# Patient Record
Sex: MALE | Race: WHITE | Employment: FULL TIME | URBAN - METROPOLITAN AREA
[De-identification: names, ages, dates, MRNs, and addresses within clinical notes are randomized per-mention and may not be internally consistent; named-entity substitution may affect disease eponyms.]

---

## 2018-11-29 ENCOUNTER — OFFICE VISIT (OUTPATIENT)
Dept: PULMONOLOGY | Age: 43
End: 2018-11-29
Payer: COMMERCIAL

## 2018-11-29 VITALS
OXYGEN SATURATION: 98 % | WEIGHT: 306 LBS | BODY MASS INDEX: 46.38 KG/M2 | SYSTOLIC BLOOD PRESSURE: 120 MMHG | HEIGHT: 68 IN | HEART RATE: 103 BPM | DIASTOLIC BLOOD PRESSURE: 82 MMHG

## 2018-11-29 DIAGNOSIS — G47.10 HYPERSOMNIA: Primary | ICD-10-CM

## 2018-11-29 DIAGNOSIS — E66.01 MORBID OBESITY, UNSPECIFIED OBESITY TYPE (HCC): Chronic | ICD-10-CM

## 2018-11-29 DIAGNOSIS — K21.9 GERD WITHOUT ESOPHAGITIS: Chronic | ICD-10-CM

## 2018-11-29 DIAGNOSIS — R06.83 SNORING: ICD-10-CM

## 2018-11-29 PROCEDURE — 99204 OFFICE O/P NEW MOD 45 MIN: CPT | Performed by: INTERNAL MEDICINE

## 2018-11-29 RX ORDER — FAMOTIDINE 20 MG/1
20 TABLET, FILM COATED ORAL 2 TIMES DAILY PRN
COMMUNITY
End: 2020-02-28

## 2018-11-29 ASSESSMENT — SLEEP AND FATIGUE QUESTIONNAIRES
HOW LIKELY ARE YOU TO NOD OFF OR FALL ASLEEP IN A CAR, WHILE STOPPED FOR A FEW MINUTES IN TRAFFIC: 0
HOW LIKELY ARE YOU TO NOD OFF OR FALL ASLEEP WHILE SITTING QUIETLY AFTER LUNCH WITHOUT ALCOHOL: 1
HOW LIKELY ARE YOU TO NOD OFF OR FALL ASLEEP WHEN YOU ARE A PASSENGER IN A CAR FOR AN HOUR WITHOUT A BREAK: 0
HOW LIKELY ARE YOU TO NOD OFF OR FALL ASLEEP WHILE WATCHING TV: 0
HOW LIKELY ARE YOU TO NOD OFF OR FALL ASLEEP WHILE SITTING AND TALKING TO SOMEONE: 0
NECK CIRCUMFERENCE (INCHES): 20
HOW LIKELY ARE YOU TO NOD OFF OR FALL ASLEEP WHILE LYING DOWN TO REST IN THE AFTERNOON WHEN CIRCUMSTANCES PERMIT: 2
HOW LIKELY ARE YOU TO NOD OFF OR FALL ASLEEP WHILE SITTING INACTIVE IN A PUBLIC PLACE: 0
ESS TOTAL SCORE: 4
HOW LIKELY ARE YOU TO NOD OFF OR FALL ASLEEP WHILE SITTING AND READING: 1

## 2018-11-29 ASSESSMENT — ENCOUNTER SYMPTOMS
APNEA: 1
CHEST TIGHTNESS: 0
CHOKING: 0
NAUSEA: 0
RHINORRHEA: 0
SHORTNESS OF BREATH: 0
ABDOMINAL PAIN: 0
VOMITING: 0
EYE PAIN: 0
PHOTOPHOBIA: 0
ABDOMINAL DISTENTION: 0
ALLERGIC/IMMUNOLOGIC NEGATIVE: 1

## 2018-11-29 NOTE — PROGRESS NOTES
20  Inches     Eyes: Pupils are equal, round, and reactive to light. Conjunctivae, EOM and lids are normal.   Neck: Trachea normal and normal range of motion. Neck supple. No tracheal deviation present. No thyroid mass and no thyromegaly present. Cardiovascular: Normal rate, regular rhythm, S1 normal, S2 normal and normal heart sounds. Pulmonary/Chest: Effort normal. No accessory muscle usage. No apnea, no tachypnea and no bradypnea. He is not intubated. No respiratory distress. He has no decreased breath sounds. He has no wheezes. He has no rhonchi. He has no rales. He exhibits no mass, no tenderness and no deformity. Abdominal: Soft. Bowel sounds are normal. He exhibits no distension. There is no tenderness. Musculoskeletal: He exhibits no edema. Gait - normal  No evidence of cyanosis or clubbing of nails   Lymphadenopathy:        Head (right side): No submental, no submandibular and no tonsillar adenopathy present. Head (left side): No submental, no submandibular and no tonsillar adenopathy present. Neurological: He is alert and oriented to person, place, and time. No cranial nerve deficit. Skin: Skin is warm, dry and intact. No cyanosis. Nails show no clubbing. Psychiatric: He has a normal mood and affect. His speech is normal and behavior is normal. Judgment and thought content normal.   Nursing note and vitals reviewed.       Electronically signed by Bhavesh Montilla MD on11/29/2018 at 1:30 PM

## 2018-12-14 ENCOUNTER — HOSPITAL ENCOUNTER (OUTPATIENT)
Dept: SLEEP CENTER | Age: 43
Discharge: HOME | End: 2018-12-14
Payer: COMMERCIAL

## 2018-12-14 PROCEDURE — 95806 SLEEP STUDY UNATT&RESP EFFT: CPT

## 2018-12-18 PROCEDURE — 95806 SLEEP STUDY UNATT&RESP EFFT: CPT | Performed by: INTERNAL MEDICINE

## 2018-12-19 ENCOUNTER — TELEPHONE (OUTPATIENT)
Dept: PULMONOLOGY | Age: 43
End: 2018-12-19

## 2019-03-08 ENCOUNTER — OFFICE VISIT (OUTPATIENT)
Dept: PULMONOLOGY | Age: 44
End: 2019-03-08
Payer: COMMERCIAL

## 2019-03-08 VITALS
SYSTOLIC BLOOD PRESSURE: 132 MMHG | OXYGEN SATURATION: 98 % | HEIGHT: 68 IN | HEART RATE: 77 BPM | WEIGHT: 310 LBS | BODY MASS INDEX: 46.98 KG/M2 | DIASTOLIC BLOOD PRESSURE: 80 MMHG

## 2019-03-08 DIAGNOSIS — K21.9 GERD WITHOUT ESOPHAGITIS: Chronic | ICD-10-CM

## 2019-03-08 DIAGNOSIS — E66.01 MORBID OBESITY, UNSPECIFIED OBESITY TYPE (HCC): Chronic | ICD-10-CM

## 2019-03-08 DIAGNOSIS — G47.33 OBSTRUCTIVE SLEEP APNEA (ADULT) (PEDIATRIC): Primary | ICD-10-CM

## 2019-03-08 PROCEDURE — 99214 OFFICE O/P EST MOD 30 MIN: CPT | Performed by: NURSE PRACTITIONER

## 2019-03-08 ASSESSMENT — SLEEP AND FATIGUE QUESTIONNAIRES
HOW LIKELY ARE YOU TO NOD OFF OR FALL ASLEEP WHILE SITTING AND TALKING TO SOMEONE: 0
HOW LIKELY ARE YOU TO NOD OFF OR FALL ASLEEP WHILE SITTING AND READING: 1
HOW LIKELY ARE YOU TO NOD OFF OR FALL ASLEEP WHILE WATCHING TV: 0
HOW LIKELY ARE YOU TO NOD OFF OR FALL ASLEEP WHEN YOU ARE A PASSENGER IN A CAR FOR AN HOUR WITHOUT A BREAK: 0
HOW LIKELY ARE YOU TO NOD OFF OR FALL ASLEEP IN A CAR, WHILE STOPPED FOR A FEW MINUTES IN TRAFFIC: 0
HOW LIKELY ARE YOU TO NOD OFF OR FALL ASLEEP WHILE SITTING QUIETLY AFTER LUNCH WITHOUT ALCOHOL: 1
HOW LIKELY ARE YOU TO NOD OFF OR FALL ASLEEP WHILE LYING DOWN TO REST IN THE AFTERNOON WHEN CIRCUMSTANCES PERMIT: 1
HOW LIKELY ARE YOU TO NOD OFF OR FALL ASLEEP WHILE SITTING INACTIVE IN A PUBLIC PLACE: 0
ESS TOTAL SCORE: 3

## 2019-03-08 ASSESSMENT — ENCOUNTER SYMPTOMS
EYE PAIN: 0
SHORTNESS OF BREATH: 0
RHINORRHEA: 0
ABDOMINAL PAIN: 0
APNEA: 0
COUGH: 0
EYE REDNESS: 0
ABDOMINAL DISTENTION: 0
SINUS PRESSURE: 0

## 2019-04-11 ENCOUNTER — TELEPHONE (OUTPATIENT)
Dept: PULMONOLOGY | Age: 44
End: 2019-04-11

## 2020-02-28 ENCOUNTER — OFFICE VISIT (OUTPATIENT)
Dept: PULMONOLOGY | Age: 45
End: 2020-02-28
Payer: COMMERCIAL

## 2020-02-28 VITALS
OXYGEN SATURATION: 98 % | WEIGHT: 309 LBS | SYSTOLIC BLOOD PRESSURE: 122 MMHG | HEART RATE: 81 BPM | DIASTOLIC BLOOD PRESSURE: 76 MMHG | HEIGHT: 68 IN | BODY MASS INDEX: 46.83 KG/M2

## 2020-02-28 PROCEDURE — 99214 OFFICE O/P EST MOD 30 MIN: CPT | Performed by: NURSE PRACTITIONER

## 2020-02-28 ASSESSMENT — ENCOUNTER SYMPTOMS
SINUS PRESSURE: 0
ABDOMINAL PAIN: 0
ABDOMINAL DISTENTION: 0
COUGH: 0
EYE PAIN: 0
EYE REDNESS: 0
APNEA: 0
SHORTNESS OF BREATH: 0
RHINORRHEA: 0

## 2020-02-28 ASSESSMENT — SLEEP AND FATIGUE QUESTIONNAIRES
HOW LIKELY ARE YOU TO NOD OFF OR FALL ASLEEP WHEN YOU ARE A PASSENGER IN A CAR FOR AN HOUR WITHOUT A BREAK: 1
ESS TOTAL SCORE: 1
HOW LIKELY ARE YOU TO NOD OFF OR FALL ASLEEP WHILE SITTING AND READING: 0
HOW LIKELY ARE YOU TO NOD OFF OR FALL ASLEEP WHILE WATCHING TV: 0
HOW LIKELY ARE YOU TO NOD OFF OR FALL ASLEEP IN A CAR, WHILE STOPPED FOR A FEW MINUTES IN TRAFFIC: 0
HOW LIKELY ARE YOU TO NOD OFF OR FALL ASLEEP WHILE SITTING AND TALKING TO SOMEONE: 0
HOW LIKELY ARE YOU TO NOD OFF OR FALL ASLEEP WHILE SITTING QUIETLY AFTER LUNCH WITHOUT ALCOHOL: 0
HOW LIKELY ARE YOU TO NOD OFF OR FALL ASLEEP WHILE LYING DOWN TO REST IN THE AFTERNOON WHEN CIRCUMSTANCES PERMIT: 0
HOW LIKELY ARE YOU TO NOD OFF OR FALL ASLEEP WHILE SITTING INACTIVE IN A PUBLIC PLACE: 0

## 2020-02-28 NOTE — PROGRESS NOTES
Chana Hinson MD, FAASM, [facility]  Manolo Smalls, MSN, RN, CNP     [address] [facility] SLEEP MEDICINE  [address] [address] [facility] [address]  Dept: [phone]  Dept Fax: : [phone] SLEEP MEDICINE  [address] [place] [id number] [phone]    Subjective:     Patient ID: Jenae Dougherty is a 40 y.o. male. Chief Complaint   Patient presents with    Sleep Apnea       HPI:      Machine Present today:  Yes    Machine Modem/Download Info:  Compliance (hours/night): 7.75 hrs/night  Download AHI (/hour): 1.6 /HR  Average CPAP Pressure : 9.1 cmH2O           APAP - Settings  Pressure Min: 7 cmH2O  Pressure Max: 17 cmH2O                 Comfort Settings  Humidity Level (0-8): 2  Heated Tubing (Yes/No): Yes  Flex/EPR (0-3): 3 PAP Mask  Mask Type: Nasal pillows  Mask Model: Nuance Pro  Mask Size: M     [place] - Total score: 1    Follow-up :     Last Visit : March 2019      Patient reports the listed chronic Co-morbidities: GERD, obesity    are well controlled and stable at this time. Subjective Health Changes: None      Over Night Oximetry: [x] Yes  [] No  [] NA [] WNL lots of artifact patient does not recall any major issues not experiencing any symptoms    Using O2: [] Yes  [x] No  [] NA   Patient is compliant with the machine  [x] Yes  [] No   Feeling rested when using the machine   [x] Yes  [] No     Pressure is comfortable with inspiration and expiration  [x] Yes  [] No     Noticed changes in pressure   [] Yes  [] No  [x] NA   Mask is fitting well  [x] Yes  [] No   Noting Mask Air Leak  [] Yes  [x] No   Having painful Aerophagia  [] Yes  [x] No   Nocturia   0  per night.    Having  HA upon waking  [] Yes  [x] No   Dry mouth upon waking   Dry Nose  Dry Eyes  [] Yes  [x] No   Congestion upon waking   [] Yes  [x] No    Nose Bleeds  [] Yes  [x] No   Using Sleep Aides    [x] NA Understands how to change humidification and/or tubing temperature for comfort while at home  [x] Yes  [] No     Difficulties falling asleep  [] Yes  [x] No   Difficulties staying asleep  [] Yes  [x] No   Approximate time to bed  10:30pm   Approximate wake time  7am   Taking Naps  no   If taking naps usual length    [x] NA   If taking naps using the machine  [] Yes  [] No  [x] NA [] With and With out    Drowsy when driving  [] Yes  [x] No     Does patient carry a DOT/CDL  [] Yes  [x] No     Does patient carry FAA/Pilots License   [] Yes  [x] No      Any concerns noted with the machine at this time  [] Yes  [x] No        Diagnosis Orders   1. Obstructive sleep apnea (adult) (pediatric)     2. GERD without esophagitis     3. Morbid obesity, unspecified obesity type ([facility]         The chronic medical conditions listed are directly related to the primary diagnosis listed above. The management of the primary diagnosis affects the secondary diagnosis and vice versa. Review of Systems   Constitutional: Negative for appetite change, chills, fatigue and fever. HENT: Negative for congestion, nosebleeds, rhinorrhea and sinus pressure. Eyes: Negative for pain and redness. Respiratory: Negative for apnea, cough and shortness of breath. Cardiovascular: Negative for chest pain and palpitations. Gastrointestinal: Negative for abdominal distention and abdominal pain. Neurological: Negative for dizziness and headaches. Psychiatric/Behavioral: Negative for sleep disturbance.        Social History     Socioeconomic History    Marital status: Unknown     Spouse name: Not on file    Number of children: Not on file    Years of education: Not on file    Highest education level: Not on file   Occupational History    Not on file   Social Needs    Financial resource strain: Not on file    Food insecurity:     Worry: Not on file     Inability: Not on file    Transportation needs:     Medical: Not on file Non-medical: Not on file   Tobacco Use    Smoking status: Never Smoker    Smokeless tobacco: Never Used   Substance and Sexual Activity    Alcohol use: Not on file    Drug use: Not on file    Sexual activity: Not on file   Lifestyle    Physical activity:     Days per week: Not on file     Minutes per session: Not on file    Stress: Not on file   Relationships    Social connections:     Talks on phone: Not on file     Gets together: Not on file     Attends Church service: Not on file     Active member of club or organization: Not on file     Attends meetings of clubs or organizations: Not on file     Relationship status: Not on file    Intimate partner violence:     Fear of current or ex partner: Not on file     Emotionally abused: Not on file     Physically abused: Not on file     Forced sexual activity: Not on file   Other Topics Concern    Not on file   Social History Narrative    Not on file       Prior to Admission medications    Not on File       Allergies as of 02/28/2020    (No Known Allergies)       Patient Active Problem List   Diagnosis    Morbid obesity, unspecified obesity type ([facility]    GERD without esophagitis    Obstructive sleep apnea (adult) (pediatric)       Past Medical History:   Diagnosis Date    GERD (gastroesophageal reflux disease)     [facility], [facility] 11/29/2018       History reviewed. No pertinent surgical history. Family History   Problem Relation Age of Onset    Diabetes Mother     Asthma Father        Vitals:  Weight BMI   Wt Readings from Last 3 Encounters:   02/28/20 (!) 309 lb (140.2 kg)   03/08/19 (!) 310 lb (140.6 kg)   11/29/18 (!) 306 lb (138.8 kg)    Body mass index is 46.98 kg/m².      BP HR SaO2   BP Readings from Last 3 Encounters:   02/28/20 122/76   03/08/19 132/80   11/29/18 120/82    Pulse Readings from Last 3 Encounters:   02/28/20 81   03/08/19 77   11/29/18 103    SpO2 Readings from Last 3 Encounters:   02/28/20 98% 03/08/19 98%   11/29/18 98%        Assessment/Plan:     GERD without esophagitis  Chronic- Stable. Cont meds per PCP and other physicians. Morbid obesity, unspecified obesity type ([facility]  Chronic-Stable. Encouraged him to work on weight loss through diet and exercise. Obstructive sleep apnea (adult) (pediatric)  Reviewed compliance download with pt. Supplies and parts as needed for his machine. These are medically necessary. Continue medications per his PCP and other physicians. Limit caffeine use after 3pm.  Encouraged him to work on weight loss through diet and exercise. Diagnoses of GERD without esophagitis and Morbid obesity, unspecified obesity type ([facility] were pertinent to this visit. The chronic medical conditions listed are directly related to the primary diagnosis listed above. The management of the primary diagnosis affects the secondary diagnosis and vice versa. The primary encounter diagnosis was Obstructive sleep apnea (adult) (pediatric). Diagnoses of GERD without esophagitis and Morbid obesity, unspecified obesity type [facility]) were also pertinent to this visit. The chronic medical conditions listed are directly related to the primary diagnosis listed above. The management of the primary diagnosis affects the secondary diagnosis and vice versa. - Educated patient and reviewed compliance download with pt.    -Supplies and parts as needed for his machine, these are medically necessary.    - [facility]  -Continue medications per his PCP and other physicians.   -Limit caffeine use after 3pm.    -Encouraged him to work on weight loss through diet and exercise. - Educated on travel machines and the processes to purchase   -F/U: 12 month. No orders of the defined types were placed in this encounter. No orders of the defined types were placed in this encounter. No orders of the defined types were placed in this encounter.       Reanna Cookie Stephenson, MSN, RN, CNP

## 2020-02-28 NOTE — ASSESSMENT & PLAN NOTE
Reviewed compliance download with pt. Supplies and parts as needed for his machine. These are medically necessary. Continue medications per his PCP and other physicians. Limit caffeine use after 3pm.  Encouraged him to work on weight loss through diet and exercise. Diagnoses of GERD without esophagitis and Morbid obesity, unspecified obesity type ([facility] were pertinent to this visit. The chronic medical conditions listed are directly related to the primary diagnosis listed above. The management of the primary diagnosis affects the secondary diagnosis and vice versa.

## 2021-03-05 ENCOUNTER — TELEPHONE (OUTPATIENT)
Dept: PULMONOLOGY | Age: 46
End: 2021-03-05

## 2021-03-05 ENCOUNTER — VIRTUAL VISIT (OUTPATIENT)
Dept: PULMONOLOGY | Age: 46
End: 2021-03-05
Payer: COMMERCIAL

## 2021-03-05 DIAGNOSIS — K21.9 GERD WITHOUT ESOPHAGITIS: Chronic | ICD-10-CM

## 2021-03-05 DIAGNOSIS — E66.01 MORBID OBESITY, UNSPECIFIED OBESITY TYPE (HCC): Chronic | ICD-10-CM

## 2021-03-05 DIAGNOSIS — G47.33 OBSTRUCTIVE SLEEP APNEA (ADULT) (PEDIATRIC): Primary | ICD-10-CM

## 2021-03-05 PROCEDURE — 99213 OFFICE O/P EST LOW 20 MIN: CPT | Performed by: NURSE PRACTITIONER

## 2021-03-05 ASSESSMENT — SLEEP AND FATIGUE QUESTIONNAIRES
HOW LIKELY ARE YOU TO NOD OFF OR FALL ASLEEP IN A CAR, WHILE STOPPED FOR A FEW MINUTES IN TRAFFIC: 0
ESS TOTAL SCORE: 1

## 2021-03-05 NOTE — ASSESSMENT & PLAN NOTE
Patient encouraged to work on maintaining a healthy weight per height. Achievable with diet restriction/modifications and exercise (may consult primary care if unsure of any restrictions or concerns).

## 2021-03-05 NOTE — PROGRESS NOTES
Dash Ryan         : 1975    Diagnosis: [x] TYLOR (G47.33) [] CSA (G47.31) [] Apnea (G47.30)   Length of Need: [x] 12 Months [] 99 Months [] Other:    Machine (MIMI!): [x] Respironics Dream Station      Auto [] ResMed AirSense     Auto [] Other:     []  CPAP () [] Bilevel ()   Mode: [] Auto [] Spontaneous    Mode: [] Auto [] Spontaneous                            Comfort Settings:   - Ramp Pressure:  cmH2O                                        - Ramp time: 15 min                                     -  Flex/EPR - 3 full time                                    - For ResMed Bilevel (TiMax-4 sec   TiMin- 0.2 sec)     Humidifier: [x] Heated ()        [x] Water chamber replacement ()/ 1 per 6 months        Mask:   [x] Nasal () /1 per 3 months [] Full Face () /1 per 3 months   [x] Patient choice -Size and fit mask [] Patient Choice - Size and fit mask   [] Dispense:  [] Dispense:    [x] Headgear () / 1 per 3 months [] Headgear () / 1 per 3 months   [] Replacement Nasal Cushion ()/2 per month [] Interface Replacement ()/1 per month   [x] Replacement Nasal Pillows ()/2 per month         Tubing: [x] Heated ()/1 per 3 months    [] Standard ()/1 per 3 months [] Other:           Filters: [x] Non-disposable ()/1 per 6 months     [x] Ultra-Fine, Disposable ()/2 per month        Miscellaneous: [] Chin Strap ()/ 1 per 6 months [] O2 bleed-in:       LPM   [] Oximetry on CPAP/Bilevel []  Other:    [x] Modem: ()         Start Order Date: 21    MEDICAL JUSTIFICATION:  I, the undersigned, certify that the above prescribed supplies are medically necessary for this patients wellbeing. In my opinion, the supplies are both reasonable and necessary in reference to accepted standards of medicalpractice in treatment of this patients condition.     Suly Omer, FAITH - CNP      NPI: [id number]       Order Signed Date: 21    Electronically signed by FAITH Chan CNP on 3/5/2021 at 9:06 AM    Renetta Rizvi  1975  [address] [address]  [phone] (home)   [phone] (mobile)      Insurance Info (confirm with patient if correct):  Payor/Plan Subscr  Sex Relation Sub.  Ins. ID Effective Group Num

## 2021-03-05 NOTE — PROGRESS NOTES
Vannesa Garcia MD, FAA, [facility]  Cresencio Wilson, MSN, RN, [address] [facility] [address]  Dept: [phone]  Dept Fax: [phone]  Loc: [phone]    Subjective:     Patient ID: Romulo Alcocer is a 39 y.o. male. Chief Complaint   Patient presents with    Sleep Apnea       HPI:      Sleep Medicine Video Visit    Pursuant to the emergency declaration under the [address] [place] [address] waiver authority and the [employer] Act this Telephone Visit was insisted, with patient's consent, to reduce the patient's risk of exposure to COVID-19 and provide continuity of care for an established patient. Services were provided through a synchronous discussion over a telephone and/or Video chat to substitute for in-person clinic visit, and coded as such. While patient is at home.     Machine Modem/Download Info:  Compliance (hours/night): 8 hrs/night  Download AHI (/hour): 1.8 /HR  Average CPAP Pressure : 10.4 cmH2O           APAP - Settings  Pressure Min: 7 cmH2O  Pressure Max: 17 cmH2O                 Comfort Settings  Humidity Level (0-8): 2  Flex/EPR (0-3): 3 PAP Mask  Mask Type: Nasal pillows  Clinically Relevant Leak: No     [place] - Total score: 1    Follow-up :     Last Visit : February 2020      Subjective Health Changes: None      Over Night Oximetry: [] Yes  [] No  [x] NA [] WNL   Using O2: [] Yes  [] No  [x] NA   Patient is compliant with the machine  [x] Yes  [] No [] Per patient   Feeling rested when using the machine   [x] Yes  [] No     Pressure is comfortable with inspiration and expiration  [x] Yes  [] No ([x] NA [] Feeling of suffocation [] Feeling like not enough air  [] To much pressure)     Noticed changes in pressure   [] Yes  [] No  [x] NA   Mask is fitting well  [x] Yes  [] No   Noting Mask Air Leak  [] Yes  [x] No   Having painful Aerophagia  [] Yes  [x] No   Nocturia   0  per night. Having  HA upon waking  [] Yes  [x] No   Dry mouth upon waking   Dry Nose  Dry Eyes  [] Yes  [x] No   Congestion upon waking   [] Yes  [x] No    Nose Bleeds  [] Yes  [x] No   Using Sleep Aides   [] OTC  [] Per our office [] Per another provider  [x] NA   Understands how to change humidification and/or tubing temperature for comfort while at home  [x] Yes  [] No     Difficulties falling asleep  [] Yes  [x] No   Difficulties staying asleep  [] Yes  [x] No   Approximate time to bed  9pm   Approximate wake time  6am   Taking Naps  no   If taking naps usual length    [x] NA   If taking naps using the machine  [] Yes  [] No  [x] NA [] With and With out    Drowsy when driving  [] Yes  [x] No     Does patient carry a DOT/CDL  [] Yes  [x] No     Does patient carry FAA/Pilots License   [] Yes  [x] No      Any concerns noted with the machine at this time  [] Yes  [x] No       Assessment/Plan:   1. Obstructive sleep apnea (adult) (pediatric)  Assessment & Plan:  Reviewed compliance download with pt. Supplies and parts as needed for his machine. These are medically necessary. Continue medications per his PCP and other physicians. Limit caffeine use after 3pm.    The chronic medical conditions listed are directly related to the primary diagnosis listed above. The management of the primary diagnosis affects the secondary diagnosis and vice vers    2. Morbid obesity, unspecified obesity type [facility])  Assessment & Plan:  Patient encouraged to work on maintaining a healthy weight per height. Achievable with diet restriction/modifications and exercise (may consult primary care if unsure of any restrictions or concerns). 3. GERD without esophagitis  Assessment & Plan:  Chronic- stable. Cont meds per PCP and other physicians. The chronic medical conditions listed are directly related to the primary diagnosis listed above.   The management of the primary diagnosis affects the secondary diagnosis and vice versa. - Reviewed compliance download with patient    -Medically necessary supplies and parts as needed for his machine.   - Continue medications per his primary care provider and other physicians.   - Encouraged to limit caffeine use after 3pm.    -  Encouraged him to work on weight loss through diet and exercise  - Educated not to drive when feeling sleepy   - Obtaining a new machine   Turning the pressure on prior to placing the mask for better fit  Traveling with the machine  - Patient using [employer]  The chronic medical conditions listed are directly related to the primary diagnosis listed above. The management of the primary diagnosis affects the secondary diagnosis and vice versa.     - Will follow up in office in 12 months

## 2021-03-09 ENCOUNTER — TELEPHONE (OUTPATIENT)
Dept: PULMONOLOGY | Age: 46
End: 2021-03-09

## 2021-06-23 ENCOUNTER — TELEPHONE (OUTPATIENT)
Dept: PULMONOLOGY | Age: 46
End: 2021-06-23

## 2021-06-23 NOTE — TELEPHONE ENCOUNTER
Patient called regarding recall. Please advise if patient should continue using until an alternative is found.  [phone]

## 2021-06-24 NOTE — TELEPHONE ENCOUNTER
Patient has severe sleep apnea with significant oxygen desaturations on this testing. Discussed stopping therapy are greater than the minor risk from this recall. We recommend the patient continue therapy and get an inline filter from his DME company until further determination is made as far as how his machine will be replaced.     Musa Capps MD

## 2021-11-08 ENCOUNTER — TELEPHONE (OUTPATIENT)
Dept: SURGERY | Age: 46
End: 2021-11-08

## 2021-11-08 NOTE — TELEPHONE ENCOUNTER
Spoke to patient asking him if he could bring the reports and photos from his procedure with Dr. Valentin Mccarthy. Patient stated he has them and will bring them tommorow to his office appointment with Nohemy Bermudez.

## 2021-11-09 ENCOUNTER — OFFICE VISIT (OUTPATIENT)
Dept: SURGERY | Age: 46
End: 2021-11-09
Payer: COMMERCIAL

## 2021-11-09 ENCOUNTER — TELEPHONE (OUTPATIENT)
Dept: SURGERY | Age: 46
End: 2021-11-09

## 2021-11-09 VITALS
SYSTOLIC BLOOD PRESSURE: 140 MMHG | OXYGEN SATURATION: 97 % | BODY MASS INDEX: 47.74 KG/M2 | TEMPERATURE: 97.6 F | HEIGHT: 68 IN | WEIGHT: 315 LBS | DIASTOLIC BLOOD PRESSURE: 83 MMHG | HEART RATE: 84 BPM

## 2021-11-09 DIAGNOSIS — E66.01 CLASS 3 SEVERE OBESITY DUE TO EXCESS CALORIES WITHOUT SERIOUS COMORBIDITY WITH BODY MASS INDEX (BMI) OF 45.0 TO 49.9 IN ADULT (HCC): ICD-10-CM

## 2021-11-09 DIAGNOSIS — D12.5 ADENOMATOUS POLYP OF SIGMOID COLON: Primary | ICD-10-CM

## 2021-11-09 PROCEDURE — 99205 OFFICE O/P NEW HI 60 MIN: CPT | Performed by: SURGERY

## 2021-11-09 RX ORDER — SODIUM CHLORIDE 0.9 % (FLUSH) 0.9 %
5-40 SYRINGE (ML) INJECTION EVERY 12 HOURS SCHEDULED
Status: CANCELLED | OUTPATIENT
Start: 2021-11-09

## 2021-11-09 RX ORDER — ACETAMINOPHEN 325 MG/1
1000 TABLET ORAL ONCE
Status: CANCELLED | OUTPATIENT
Start: 2021-11-09 | End: 2021-11-09

## 2021-11-09 RX ORDER — SODIUM CHLORIDE 0.9 % (FLUSH) 0.9 %
5-40 SYRINGE (ML) INJECTION PRN
Status: CANCELLED | OUTPATIENT
Start: 2021-11-09

## 2021-11-09 RX ORDER — SODIUM CHLORIDE 9 MG/ML
25 INJECTION, SOLUTION INTRAVENOUS PRN
Status: CANCELLED | OUTPATIENT
Start: 2021-11-09

## 2021-11-09 NOTE — LETTER
[facility] Surgeons of [address] [place] [address] Road [phone]  f [phone]    Charlotte Coulter MD                        SURGERY ORDER   -- Time of order -- 21    10:02 AM    Facility:   [facility]  # _________________                                                                                    Scheduled By:____________                  Surgery Date & Time: 21 at 9:15AM       Pt arrival: 7:15AM                                                                                      Patient Name:  Jules Meyer     :  1975     PCP:  [address] [place] [address] Road Ph:    [phone] (home)                                                     PROCEDURE:  Laparoscopic assisted colonoscopy with polypectomy (need good scope with snares/cautery, tattoo, clips, irrigation) [id number]    DIAGNOSIS:  Sigmoid polyp D12.5    Anesthesia: _General    Anesthesia (lines, blocks, TAP/epidural, etc): none  Time Needed:  45 minutes    Pt Position:  lithotomy    Ureteral Stents: no  Ostomy Marking: no          Outpatient_x__                  Pre-Op clearance to be done by: N/A    Cardiac Clearance Done by: N/A    Medications to be stopped 5 days before surgery: N/A                                                                                      Covid Vaccinated                                                                                                                   Charlotte Coulter MD

## 2021-11-09 NOTE — PROGRESS NOTES
[address] [address] [address] [facility] [address]  Dept: [phone]  Dept Fax: [phone]  Loc: [phone]    Visit Date: 11/9/2021    Rosalind Gar is a 55 y.o. male who presents today for: New Patient (Sigmoid Polyp-referred by Dr. Verna Jennings)      HPI:       Rosalind Gar is a 55 y.o. male referred to me by Dr. Mainor Vaca of GI for further evaluation regarding endoscopically unresectable polyp of the sigmoid colon. Tammie Rico recently underwent colonoscopy, as his father was recently diagnosed with stage IV stomach cancer, and he wanted to get his screening tests done. He had the colonoscopy earlier this week. He had multiple polyps that were removed, with the exception of a large sigmoid polyp on a broad stalk. It was not biopsied. Denies previous abdominal surgeries. Denies previous colonoscopy. Denies family history of colon cancer. Patient's problem list, medications, past medical, surgical, family, and social histories were reviewed and updated in the chart as indicated today. Past Medical History:   Diagnosis Date    GERD (gastroesophageal reflux disease)     Morbid obesity, unspecified obesity type ([facility].) 11/29/2018       No past surgical history on file. Cancer-related family history is not on file. Social History:   Social History     Tobacco Use    Smoking status: Never Smoker    Smokeless tobacco: Never Used   Substance Use Topics    Alcohol use: Not on file      Tobacco cessation counseling provided as appropriate. REVIEW OF SYSTEMS:    Pertinent positives and negatives are mentioned in the HPI above. Otherwise, all other systems were reviewed and negative. Objective:     Physical Exam   BP (!) 140/83   Pulse 84   Temp 97.6 °F (36.4 °C) (Oral)   Ht 5' 8\" (1.727 m)   Wt (!) 318 lb 9.6 oz (144.5 kg)   SpO2 97%   BMI 48.44 kg/m²   Constitutional: Appears well-developed and well-nourished.  Grooming appropriate. No gross deformities. Body mass index is 48.44 kg/m². Eyes: No scleral icterus. Conjunctiva/lids normal. Vision intact grossly. Pupils equal/symmetric, reactive bilaterally. ENT: External ears/nose without defect, scars, or masses. Hearing grossly intact. No facial deformity. Lips normal, normal dentition. Neck: No masses. Trachea midline. No crepitus. Thyroid not enlarged. Cardiovascular: Normal rate. No peripheral edema. Abdominal aorta normal size to palpation. Pulmonary/Chest: Effort normal. No respiratory distress. No wheezes. No use of accessory muscles. Musculoskeletal: Normal range of motion x all 4 extremities and head/neck, without deformity, pain, or crepitus, with normal strength and tone. Normal gait. Nails without clubbing or cyanosis. Neurological: Alert and oriented to person, place, and time. No gross deficits. Sensation intact. Skin: Skin is dry. No rashes noted. No pallor. No induration of nodules. Psychiatric: Normal mood and affect. Behavior normal. Oriented to person, place, and time. Judgment and insight reasonable. Abdominal/wound: Soft, obese, nontender    Labs reviewed: Reviewed pathology report from recent colonoscopy  Radiology reviewed: None    Last colonoscopy: Enio Patel, earlier this week. Reviewed his endoscopy report, including interpretation of images. Coordination of care with Dr. Bigg Silverman      Assessment/Plan:     A/P:  New problem(s): Large polyp of sigmoid colon on broad stalk  Established problem(s): Obesity  Additional workup/treatment planned: Laparoscopic assisted colonoscopy with polypectomy  Risk of complications/morbidity: High    I discussed with Karen Swartz and his wife the pathophysiology, etiology, work-up, atrophy, treatment options regarding endoscopically unresectable polyps of the colon. After reviewing the colonoscopy pictures, I do think that this is amenable to endoscopic removal, though would be high risk then with typical colonoscopy.   I offered either repeat colonoscopy, versus laparoscopic assisted colonoscopy, and I even offered formal colectomy. After discussing all of the options and risks, we agreed to proceed with laparoscopic assisted colonoscopy. Discussed the risks of the procedure, including not limited to bleeding, infection, recurrence of polyp, perforation, need for additional operations, damage to intra-abdominal structures. Discussed that recovery will be typically a bit longer than with a normal colonoscopy given the need for laparoscopic incisions. Discussed that final pathology may reveal malignancy or another finding that could lead to colectomy down the road. They wish to proceed in about 1 month with surgery, which is perfectly reasonable. DISPOSITION:  Surgery December    My findings will be relayed to consulting practitioner or PCP via Epic    Note completed using dictation software, please excuse any errors.     Electronically signed by Dre Olmos MD on 11/9/2021 at 9:57 AM

## 2021-11-09 NOTE — TELEPHONE ENCOUNTER
Patient has been scheduled for:    Procedure: Lap assisted colonoscopy   Date: 12/13/21  Time: 9:15am  Arrival: 7:15am  [facility]   ASA?: N/A  Prep? Colon prep, gave instructions in office    Pre-op? N/A    Post-op Appt? 12/29/21 at 1:45pm    Patient advised they will need a . Orders faxed to surgery scheduling. Instructions have been emailed to:  [email or web address]  [email or web address]

## 2021-12-10 ENCOUNTER — TELEPHONE (OUTPATIENT)
Dept: SURGERY | Age: 46
End: 2021-12-10

## 2021-12-10 NOTE — TELEPHONE ENCOUNTER
I have placed a reminder call to patient for upcoming procedure. Did you speak directly to patient or leave a voicemail? Spoke to patient     Prep? NPO 12AM  Colonoscopy prep      Must have a  that is over the age of 25. Must be a friend or family member that can be responsible for signing them out after surgery.  (wife)    Arrive at the main entrance of [facility]

## 2021-12-13 ENCOUNTER — ANESTHESIA (OUTPATIENT)
Dept: OPERATING ROOM | Age: 46
End: 2021-12-13
Payer: COMMERCIAL

## 2021-12-13 ENCOUNTER — HOSPITAL ENCOUNTER (OUTPATIENT)
Age: 46
Setting detail: OUTPATIENT SURGERY
Discharge: HOME | End: 2021-12-13
Attending: SURGERY | Admitting: SURGERY
Payer: COMMERCIAL

## 2021-12-13 ENCOUNTER — ANESTHESIA EVENT (OUTPATIENT)
Dept: OPERATING ROOM | Age: 46
End: 2021-12-13
Payer: COMMERCIAL

## 2021-12-13 VITALS
HEIGHT: 68 IN | WEIGHT: 315 LBS | SYSTOLIC BLOOD PRESSURE: 128 MMHG | HEART RATE: 82 BPM | OXYGEN SATURATION: 92 % | DIASTOLIC BLOOD PRESSURE: 73 MMHG | RESPIRATION RATE: 16 BRPM | BODY MASS INDEX: 47.74 KG/M2 | TEMPERATURE: 97.4 F

## 2021-12-13 VITALS — OXYGEN SATURATION: 94 % | DIASTOLIC BLOOD PRESSURE: 68 MMHG | SYSTOLIC BLOOD PRESSURE: 114 MMHG

## 2021-12-13 DIAGNOSIS — G89.18 ACUTE POST-OPERATIVE PAIN: Primary | ICD-10-CM

## 2021-12-13 DIAGNOSIS — D12.5 ADENOMATOUS POLYP OF SIGMOID COLON: ICD-10-CM

## 2021-12-13 LAB
ABO/RH: NORMAL
ANTIBODY SCREEN: NORMAL

## 2021-12-13 PROCEDURE — 2500000003 HC RX 250 WO HCPCS: Performed by: SURGERY

## 2021-12-13 PROCEDURE — 7100000010 HC PHASE II RECOVERY - FIRST 15 MIN: Performed by: SURGERY

## 2021-12-13 PROCEDURE — 86901 BLOOD TYPING SEROLOGIC RH(D): CPT

## 2021-12-13 PROCEDURE — 3700000000 HC ANESTHESIA ATTENDED CARE: Performed by: SURGERY

## 2021-12-13 PROCEDURE — 3700000001 HC ADD 15 MINUTES (ANESTHESIA): Performed by: SURGERY

## 2021-12-13 PROCEDURE — 2500000003 HC RX 250 WO HCPCS: Performed by: ANESTHESIOLOGY

## 2021-12-13 PROCEDURE — 6370000000 HC RX 637 (ALT 250 FOR IP): Performed by: SURGERY

## 2021-12-13 PROCEDURE — 3600000004 HC SURGERY LEVEL 4 BASE: Performed by: SURGERY

## 2021-12-13 PROCEDURE — 45381 COLONOSCOPY SUBMUCOUS NJX: CPT | Performed by: SURGERY

## 2021-12-13 PROCEDURE — 88305 TISSUE EXAM BY PATHOLOGIST: CPT

## 2021-12-13 PROCEDURE — 2580000003 HC RX 258: Performed by: SURGERY

## 2021-12-13 PROCEDURE — 3600000014 HC SURGERY LEVEL 4 ADDTL 15MIN: Performed by: SURGERY

## 2021-12-13 PROCEDURE — 7100000011 HC PHASE II RECOVERY - ADDTL 15 MIN: Performed by: SURGERY

## 2021-12-13 PROCEDURE — 6360000002 HC RX W HCPCS: Performed by: ANESTHESIOLOGY

## 2021-12-13 PROCEDURE — 2709999900 HC NON-CHARGEABLE SUPPLY: Performed by: SURGERY

## 2021-12-13 PROCEDURE — 7100000001 HC PACU RECOVERY - ADDTL 15 MIN: Performed by: SURGERY

## 2021-12-13 PROCEDURE — 6370000000 HC RX 637 (ALT 250 FOR IP): Performed by: ANESTHESIOLOGY

## 2021-12-13 PROCEDURE — 45385 COLONOSCOPY W/LESION REMOVAL: CPT | Performed by: SURGERY

## 2021-12-13 PROCEDURE — 86850 RBC ANTIBODY SCREEN: CPT

## 2021-12-13 PROCEDURE — 49320 DIAG LAPARO SEPARATE PROC: CPT | Performed by: SURGERY

## 2021-12-13 PROCEDURE — 86900 BLOOD TYPING SEROLOGIC ABO: CPT

## 2021-12-13 PROCEDURE — 6360000002 HC RX W HCPCS: Performed by: SURGERY

## 2021-12-13 PROCEDURE — 7100000000 HC PACU RECOVERY - FIRST 15 MIN: Performed by: SURGERY

## 2021-12-13 RX ORDER — GLYCOPYRROLATE 1 MG/5 ML
SYRINGE (ML) INTRAVENOUS PRN
Status: DISCONTINUED | OUTPATIENT
Start: 2021-12-13 | End: 2021-12-13 | Stop reason: SDUPTHER

## 2021-12-13 RX ORDER — LIDOCAINE HYDROCHLORIDE 20 MG/ML
INJECTION, SOLUTION INFILTRATION; PERINEURAL PRN
Status: DISCONTINUED | OUTPATIENT
Start: 2021-12-13 | End: 2021-12-13 | Stop reason: SDUPTHER

## 2021-12-13 RX ORDER — ONDANSETRON 2 MG/ML
INJECTION INTRAMUSCULAR; INTRAVENOUS PRN
Status: DISCONTINUED | OUTPATIENT
Start: 2021-12-13 | End: 2021-12-13 | Stop reason: SDUPTHER

## 2021-12-13 RX ORDER — BUPIVACAINE HYDROCHLORIDE 5 MG/ML
INJECTION, SOLUTION EPIDURAL; INTRACAUDAL PRN
Status: DISCONTINUED | OUTPATIENT
Start: 2021-12-13 | End: 2021-12-13 | Stop reason: ALTCHOICE

## 2021-12-13 RX ORDER — SODIUM CHLORIDE 0.9 % (FLUSH) 0.9 %
5-40 SYRINGE (ML) INJECTION PRN
Status: DISCONTINUED | OUTPATIENT
Start: 2021-12-13 | End: 2021-12-13 | Stop reason: HOSPADM

## 2021-12-13 RX ORDER — ACETAMINOPHEN 500 MG
1000 TABLET ORAL ONCE
Status: COMPLETED | OUTPATIENT
Start: 2021-12-13 | End: 2021-12-13

## 2021-12-13 RX ORDER — 0.9 % SODIUM CHLORIDE 0.9 %
500 INTRAVENOUS SOLUTION INTRAVENOUS
Status: DISCONTINUED | OUTPATIENT
Start: 2021-12-13 | End: 2021-12-13 | Stop reason: HOSPADM

## 2021-12-13 RX ORDER — GABAPENTIN 300 MG/1
300 CAPSULE ORAL
Status: DISCONTINUED | OUTPATIENT
Start: 2021-12-13 | End: 2021-12-13 | Stop reason: HOSPADM

## 2021-12-13 RX ORDER — SODIUM CHLORIDE, SODIUM LACTATE, POTASSIUM CHLORIDE, AND CALCIUM CHLORIDE .6; .31; .03; .02 G/100ML; G/100ML; G/100ML; G/100ML
IRRIGANT IRRIGATION PRN
Status: DISCONTINUED | OUTPATIENT
Start: 2021-12-13 | End: 2021-12-13 | Stop reason: ALTCHOICE

## 2021-12-13 RX ORDER — FENTANYL CITRATE 50 UG/ML
INJECTION, SOLUTION INTRAMUSCULAR; INTRAVENOUS PRN
Status: DISCONTINUED | OUTPATIENT
Start: 2021-12-13 | End: 2021-12-13 | Stop reason: SDUPTHER

## 2021-12-13 RX ORDER — MEPERIDINE HYDROCHLORIDE 25 MG/ML
12.5 INJECTION INTRAMUSCULAR; INTRAVENOUS; SUBCUTANEOUS EVERY 5 MIN PRN
Status: DISCONTINUED | OUTPATIENT
Start: 2021-12-13 | End: 2021-12-13 | Stop reason: HOSPADM

## 2021-12-13 RX ORDER — ACETAMINOPHEN 500 MG
1000 TABLET ORAL
Status: DISCONTINUED | OUTPATIENT
Start: 2021-12-13 | End: 2021-12-13 | Stop reason: HOSPADM

## 2021-12-13 RX ORDER — SCOLOPAMINE TRANSDERMAL SYSTEM 1 MG/1
1 PATCH, EXTENDED RELEASE TRANSDERMAL ONCE
Status: DISCONTINUED | OUTPATIENT
Start: 2021-12-13 | End: 2021-12-13 | Stop reason: HOSPADM

## 2021-12-13 RX ORDER — MIDAZOLAM HYDROCHLORIDE 1 MG/ML
INJECTION INTRAMUSCULAR; INTRAVENOUS PRN
Status: DISCONTINUED | OUTPATIENT
Start: 2021-12-13 | End: 2021-12-13 | Stop reason: SDUPTHER

## 2021-12-13 RX ORDER — LEVOFLOXACIN 5 MG/ML
500 INJECTION, SOLUTION INTRAVENOUS
Status: COMPLETED | OUTPATIENT
Start: 2021-12-13 | End: 2021-12-13

## 2021-12-13 RX ORDER — ONDANSETRON 2 MG/ML
4 INJECTION INTRAMUSCULAR; INTRAVENOUS
Status: DISCONTINUED | OUTPATIENT
Start: 2021-12-13 | End: 2021-12-13 | Stop reason: HOSPADM

## 2021-12-13 RX ORDER — CISATRACURIUM BESYLATE 2 MG/ML
INJECTION, SOLUTION INTRAVENOUS PRN
Status: DISCONTINUED | OUTPATIENT
Start: 2021-12-13 | End: 2021-12-13 | Stop reason: SDUPTHER

## 2021-12-13 RX ORDER — NEOSTIGMINE METHYLSULFATE 5 MG/5 ML
SYRINGE (ML) INTRAVENOUS PRN
Status: DISCONTINUED | OUTPATIENT
Start: 2021-12-13 | End: 2021-12-13 | Stop reason: SDUPTHER

## 2021-12-13 RX ORDER — CELECOXIB 200 MG/1
200 CAPSULE ORAL
Status: DISCONTINUED | OUTPATIENT
Start: 2021-12-13 | End: 2021-12-13 | Stop reason: HOSPADM

## 2021-12-13 RX ORDER — PROPOFOL 10 MG/ML
INJECTION, EMULSION INTRAVENOUS PRN
Status: DISCONTINUED | OUTPATIENT
Start: 2021-12-13 | End: 2021-12-13 | Stop reason: SDUPTHER

## 2021-12-13 RX ORDER — SODIUM CHLORIDE 9 MG/ML
25 INJECTION, SOLUTION INTRAVENOUS PRN
Status: DISCONTINUED | OUTPATIENT
Start: 2021-12-13 | End: 2021-12-13 | Stop reason: HOSPADM

## 2021-12-13 RX ORDER — DOCUSATE SODIUM 100 MG/1
100 CAPSULE, LIQUID FILLED ORAL 2 TIMES DAILY
Qty: 30 CAPSULE | Refills: 0 | Status: SHIPPED | OUTPATIENT
Start: 2021-12-13 | End: 2021-12-27

## 2021-12-13 RX ORDER — APREPITANT 40 MG/1
40 CAPSULE ORAL ONCE
Status: COMPLETED | OUTPATIENT
Start: 2021-12-13 | End: 2021-12-13

## 2021-12-13 RX ORDER — DEXAMETHASONE SODIUM PHOSPHATE 4 MG/ML
INJECTION, SOLUTION INTRA-ARTICULAR; INTRALESIONAL; INTRAMUSCULAR; INTRAVENOUS; SOFT TISSUE PRN
Status: DISCONTINUED | OUTPATIENT
Start: 2021-12-13 | End: 2021-12-13 | Stop reason: SDUPTHER

## 2021-12-13 RX ORDER — SODIUM CHLORIDE 0.9 % (FLUSH) 0.9 %
5-40 SYRINGE (ML) INJECTION EVERY 12 HOURS SCHEDULED
Status: DISCONTINUED | OUTPATIENT
Start: 2021-12-13 | End: 2021-12-13 | Stop reason: HOSPADM

## 2021-12-13 RX ORDER — OXYCODONE HYDROCHLORIDE 5 MG/1
5 TABLET ORAL EVERY 6 HOURS PRN
Qty: 6 TABLET | Refills: 0 | Status: SHIPPED | OUTPATIENT
Start: 2021-12-13 | End: 2021-12-18

## 2021-12-13 RX ADMIN — FENTANYL CITRATE 100 MCG: 50 INJECTION, SOLUTION INTRAMUSCULAR; INTRAVENOUS at 11:50

## 2021-12-13 RX ADMIN — DEXAMETHASONE SODIUM PHOSPHATE 8 MG: 4 INJECTION, SOLUTION INTRAMUSCULAR; INTRAVENOUS at 11:50

## 2021-12-13 RX ADMIN — ONDANSETRON 4 MG: 2 INJECTION INTRAMUSCULAR; INTRAVENOUS at 12:27

## 2021-12-13 RX ADMIN — Medication 5 MG: at 12:25

## 2021-12-13 RX ADMIN — LEVOFLOXACIN 500 MG: 5 INJECTION, SOLUTION INTRAVENOUS at 11:55

## 2021-12-13 RX ADMIN — Medication 1 MG: at 12:25

## 2021-12-13 RX ADMIN — ENOXAPARIN SODIUM 40 MG: 40 INJECTION SUBCUTANEOUS at 09:53

## 2021-12-13 RX ADMIN — APREPITANT 40 MG: 40 CAPSULE ORAL at 10:24

## 2021-12-13 RX ADMIN — GABAPENTIN 300 MG: 300 CAPSULE ORAL at 09:49

## 2021-12-13 RX ADMIN — MIDAZOLAM HYDROCHLORIDE 2 MG: 2 INJECTION, SOLUTION INTRAMUSCULAR; INTRAVENOUS at 11:50

## 2021-12-13 RX ADMIN — CELECOXIB 200 MG: 200 CAPSULE ORAL at 09:49

## 2021-12-13 RX ADMIN — METRONIDAZOLE 500 MG: 500 INJECTION, SOLUTION INTRAVENOUS at 11:55

## 2021-12-13 RX ADMIN — LIDOCAINE HYDROCHLORIDE 50 MG: 20 INJECTION, SOLUTION INFILTRATION; PERINEURAL at 11:50

## 2021-12-13 RX ADMIN — CISATRACURIUM BESYLATE 12 MG: 2 INJECTION INTRAVENOUS at 11:50

## 2021-12-13 RX ADMIN — ACETAMINOPHEN 1000 MG: 500 TABLET, FILM COATED ORAL at 09:49

## 2021-12-13 RX ADMIN — PROPOFOL 50 MG: 10 INJECTION, EMULSION INTRAVENOUS at 11:50

## 2021-12-13 ASSESSMENT — PULMONARY FUNCTION TESTS
PIF_VALUE: 1
PIF_VALUE: 27
PIF_VALUE: 27
PIF_VALUE: 39
PIF_VALUE: 34
PIF_VALUE: 30
PIF_VALUE: 0
PIF_VALUE: 27
PIF_VALUE: 39
PIF_VALUE: 1
PIF_VALUE: 38
PIF_VALUE: 9
PIF_VALUE: 39
PIF_VALUE: 28
PIF_VALUE: 27
PIF_VALUE: 28
PIF_VALUE: 1
PIF_VALUE: 0
PIF_VALUE: 39
PIF_VALUE: 32
PIF_VALUE: 2
PIF_VALUE: 27
PIF_VALUE: 27
PIF_VALUE: 20
PIF_VALUE: 41
PIF_VALUE: 39
PIF_VALUE: 5
PIF_VALUE: 28
PIF_VALUE: 12
PIF_VALUE: 39
PIF_VALUE: 18
PIF_VALUE: 28
PIF_VALUE: 28
PIF_VALUE: 33
PIF_VALUE: 1
PIF_VALUE: 28
PIF_VALUE: 28
PIF_VALUE: 27
PIF_VALUE: 40
PIF_VALUE: 40
PIF_VALUE: 2
PIF_VALUE: 39
PIF_VALUE: 39
PIF_VALUE: 35
PIF_VALUE: 28
PIF_VALUE: 19
PIF_VALUE: 8
PIF_VALUE: 30
PIF_VALUE: 0
PIF_VALUE: 38
PIF_VALUE: 39
PIF_VALUE: 27
PIF_VALUE: 48
PIF_VALUE: 34
PIF_VALUE: 26
PIF_VALUE: 32
PIF_VALUE: 5
PIF_VALUE: 33
PIF_VALUE: 16
PIF_VALUE: 28
PIF_VALUE: 40
PIF_VALUE: 28
PIF_VALUE: 28
PIF_VALUE: 38
PIF_VALUE: 7
PIF_VALUE: 1

## 2021-12-13 ASSESSMENT — PAIN SCALES - GENERAL
PAINLEVEL_OUTOF10: 0

## 2021-12-13 ASSESSMENT — PAIN - FUNCTIONAL ASSESSMENT: PAIN_FUNCTIONAL_ASSESSMENT: 0-10

## 2021-12-13 NOTE — ANESTHESIA PRE PROCEDURE
Department of Anesthesiology  Preprocedure Note       Name:  Melani Murrell   Age:  55 y.o.  :  1975                                          MRN:  [id number]         Date:  2021      Surgeon: Nicole Christine):  Cecy Bustos MD    Procedure: Procedure(s):  LAPAROSCOPIC ASSISTED COLONOSCOPY WITH POLYPECTOMY    Medications prior to admission:   Prior to Admission medications    Not on File       Current medications:    Current Facility-Administered Medications   Medication Dose Route Frequency Provider Last Rate Last Admin    0.9 % sodium chloride infusion  25 mL IntraVENous PRN Cecy Bustos MD        metronidazole (FLAGYL) 500 mg in NaCl 100 mL IVPB premix  500 mg IntraVENous On Call to Via Kaelyn Pitts MD        And    levoFLOXacin (LEVAQUIN) 500 MG/100ML infusion 500 mg  500 mg IntraVENous On Call to Via Kaelyn Pitts MD        sodium chloride flush 0.9 % injection 5-40 mL  5-40 mL IntraVENous 2 times per day Cecy Bustos MD        sodium chloride flush 0.9 % injection 5-40 mL  5-40 mL IntraVENous PRN Cecy Bustos MD        gabapentin (NEURONTIN) capsule 300 mg  300 mg Oral 90 Min Pre-Op Sara Da, DO   300 mg at 21 0483    celecoxib (CELEBREX) capsule 200 mg  200 mg Oral 90 Min Pre-Op Sara Da, DO   200 mg at 21 2836    acetaminophen (TYLENOL) tablet 1,000 mg  1,000 mg Oral 90 Min Pre-Op Sara Da, DO        HYDROmorphone (DILAUDID) injection 0.25 mg  0.25 mg IntraVENous Q5 Min PRN Sara Da, DO        HYDROmorphone (DILAUDID) injection 0.5 mg  0.5 mg IntraVENous Q5 Min PRN Sara Da, DO        0.9 % sodium chloride bolus  500 mL IntraVENous Once PRN Sara Da, DO        ondansetron [facility]) injection 4 mg  4 mg IntraVENous Once PRN Sara Da, DO        meperidine (DEMEROL) injection 12.5 mg  12.5 mg IntraVENous Q5 Min PRN Sara Da, DO        scopolamine (TRANSDERM-SCOP) transdermal patch 1 patch  1 patch TransDERmal Once Daksha Endo MD Domitila   1 patch at 12/13/21 1024       Allergies:  No Known Allergies    Problem List:    Patient Active Problem List   Diagnosis Code    Morbid obesity, unspecified obesity type ([facility] 75[facility] E66.01    GERD without esophagitis K21.9    Obstructive sleep apnea (adult) (pediatric) G47.33       Past Medical History:        Diagnosis Date    GERD (gastroesophageal reflux disease)     Morbid obesity, unspecified obesity type ([facility] 11/29/2018       Past Surgical History:  No past surgical history on file. Social History:    Social History     Tobacco Use    Smoking status: Never Smoker    Smokeless tobacco: Never Used   Substance Use Topics    Alcohol use: Not on file                                Counseling given: Not Answered      Vital Signs (Current):   Vitals:    12/07/21 0842 12/13/21 0926   BP:  (!) 147/86   Pulse:  94   Resp:  18   Temp:  98.2 °F (36.8 °C)   TempSrc:  Temporal   SpO2:  96%   Weight: (!) 318 lb (144.2 kg) (!) 318 lb (144.2 kg)   Height: 5' 8\" (1.727 m) 5' 8\" (1.727 m)                                              BP Readings from Last 3 Encounters:   12/13/21 (!) 147/86   11/09/21 (!) 140/83   02/28/20 122/76       NPO Status: Time of last liquid consumption: 2345                        Time of last solid consumption: 1700                        Date of last liquid consumption: 12/12/21                        Date of last solid food consumption: 12/11/21    BMI:   Wt Readings from Last 3 Encounters:   12/13/21 (!) 318 lb (144.2 kg)   11/09/21 (!) 318 lb 9.6 oz (144.5 kg)   02/28/20 (!) 309 lb (140.2 kg)     Body mass index is 48.35 kg/m². CBC: No results found for: WBC, RBC, HGB, HCT, MCV, RDW, PLT    CMP: No results found for: NA, K, CL, CO2, BUN, CREATININE, GFRAA, AGRATIO, LABGLOM, GLUCOSE, PROT, CALCIUM, BILITOT, ALKPHOS, AST, ALT    POC Tests: No results for input(s): POCGLU, POCNA, POCK, POCCL, POCBUN, POCHEMO, POCHCT in the last 72 hours.     Coags: No results found for: PROTIME, INR, APTT    HCG (If Applicable): No results found for: PREGTESTUR, PREGSERUM, HCG, HCGQUANT     ABGs: No results found for: PHART, PO2ART, IDW6MPD, FTK6ZOF, BEART, I8BDPPKZ     Type & Screen (If Applicable):  No results found for: LABABO, LABRH    Drug/Infectious Status (If Applicable):  No results found for: HIV, HEPCAB    COVID-19 Screening (If Applicable): No results found for: COVID19        Anesthesia Evaluation  Patient summary reviewed and Nursing notes reviewed   history of anesthetic complications: PONV. Airway: Mallampati: IV  TM distance: >3 FB   Neck ROM: full  Mouth opening: > = 3 FB Dental:    (+) other      Pulmonary:normal exam  breath sounds clear to auscultation  (+) sleep apnea: on CPAP,                             Cardiovascular:Negative CV ROS  Exercise tolerance: good (>4 METS),           Rhythm: regular  Rate: normal                    Neuro/Psych:   Negative Neuro/Psych ROS              GI/Hepatic/Renal:   (+) GERD:, morbid obesity          Endo/Other: Negative Endo/Other ROS                    Abdominal:   (+) obese,     Abdomen: soft. Vascular: negative vascular ROS. Other Findings: Crowns            Anesthesia Plan      general     ASA 3       Induction: intravenous. MIPS: Postoperative opioids intended and Prophylactic antiemetics administered. Anesthetic plan and risks discussed with patient. Use of blood products discussed with patient whom consented to blood products.      Attending anesthesiologist reviewed and agrees with Preprocedure content              Boyd Ingram DO   12/13/2021

## 2021-12-13 NOTE — H&P
Insecurity:     Worried About Running Out of Food in the Last Year: Not on file    Kamari of Food in the Last Year: Not on file   Transportation Needs:     Lack of Transportation (Medical): Not on file    Lack of Transportation (Non-Medical):  Not on file   Physical Activity:     Days of Exercise per Week: Not on file    Minutes of Exercise per Session: Not on file   Stress:     Feeling of Stress : Not on file   Social Connections:     Frequency of Communication with Friends and Family: Not on file    Frequency of Social Gatherings with Friends and Family: Not on file    Attends Islam Services: Not on file    Active Member of [address] [place] [address] [employer] or Organizations: Not on file    Attends Club or Organization Meetings: Not on file    Marital Status: Not on file   Intimate Partner Violence:     Fear of Current or Ex-Partner: Not on file    Emotionally Abused: Not on file    Physically Abused: Not on file    Sexually Abused: Not on file   Housing Stability:     Unable to Pay for Housing in the Last Year: Not on file    Number of [place] in the Last Year: Not on file    Unstable Housing in the Last Year: Not on file         Family History:       Problem Relation Age of Onset    Diabetes Mother     Asthma Father          REVIEW OF SYSTEMS:    Constitutional: Negative for chills, fatigue and fever   HENT: Negative for loss of hearing   Eyes: Negative for visual disturbance  Respiratory: Negative for shortness of breath and wheezing    Cardiovascular: Negative for chest pain, palpitations and exertional chest pressure  Gastrointestinal: Negative for constipation, diarrhea, nausea and vomiting   Musculoskeletal: Negative for gait problem, and joint swelling    Skin: Negative for rash and nonhealing wounds   Neurological: Negative for dizziness, syncope, light-headedness    Hematological: Does not bruise/bleed easily   Psychiatric/Behavioral: Negative for agitation    PHYSICAL EXAM:      BP (!) 147/86   Pulse 94   Temp 98.2 °F (36.8 °C) (Temporal)   Resp 18   Ht 5' 8\" (1.727 m)   Wt (!) 318 lb (144.2 kg)   SpO2 96%   BMI 48.35 kg/m²  I      Eyes:  pupils equal, round and reactive to light and conjunctiva normal    Head/ENT:  Normocephalic, without obvious abnormality, atramatic,     Neck:  Supple, symmetrical, trachea midline, no adenopathy, no tenderness, skin warm and dry. Heart: Regular rate and rhythm    Lungs:  No increased work of breathing, good air exchange, clear to auscultation bilaterally, no crackles or wheezing    Abdomen:  Soft, non-distended, non-tender, no masses palpated    Extremities:  No clubbing, cyanosis, or edema      ASSESSMENT AND PLAN:    1. Patient is a 55 y.o. male with above specified procedure planned. 2.  Access to ancillary services are available per request of the provider.     Shante Marie, FAITH - CNP   12/13/2021

## 2021-12-13 NOTE — BRIEF OP NOTE
Brief Postoperative Note      Patient: Isis Denton  YOB: 1975  MRN: [id number]    Date of Procedure: 12/13/2021    Pre-Op Diagnosis: SIGMOID POLYP    Post-Op Diagnosis: Same       Procedure(s):  LAPAROSCOPIC ASSISTED COLONOSCOPY WITH POLYPECTOMY, TATTOO    Surgeon(s):  Luma Gutierrez MD    Assistant:  Resident: Hiral Riggs MD    Anesthesia: General    Estimated Blood Loss (mL): Minimal    Complications: None    Specimens:   ID Type Source Tests Collected by Time Destination   A : A. SIGMOID POLYP Tissue Tissue SURGICAL PATHOLOGY Luma Gutierrez MD 12/13/2021 1224      Findings:   Laparoscopically assisted endoscopic removal of a large pedunculated polyp. The area immediately adjacent to the site was tattooed. No complications.      Electronically signed by Hiral Riggs MD on 12/13/2021 at 12:48 PM

## 2021-12-13 NOTE — PROGRESS NOTES
Admitted to pacu at this time. VSS on monitor. Pt sedated with Nasal trumpet in LT nares and NRB mask on.  Report given by Dr. Qamar Desai
Ambulatory Surgery/Procedure Discharge Note    Vitals:    12/13/21 1515   BP: 128/73   Pulse: 82   Resp: 16   Temp: 97.4 °F (36.3 °C)   SpO2: 92%       In: 200 [P.O.:200]  Out: -     Restroom use offered before discharge. Yes    Pain assessment:  level of pain (1-10, 10 severe),   Pain Level: 0    Abdominal incisions well approx with surgical glue D&I with abd slight distended with BS X4 and pt denies any pain at this time. Tolerates po well. S.O. at bedside. DC instructions given to pt and S.O with verbalization of understanding of pt and S.O. and both states 'ready to go home'  Patient discharged to home/self care. Patient discharged via wheel chair by transporter to waiting family/S. O.
PACU Transfer to [facility]    Vitals:    12/13/21 1430   BP: (!) 144/84   Pulse: 86   Resp: 21   Temp: 96.7 °F (35.9 °C)   SpO2: 98%         Intake/Output Summary (Last 24 hours) at 12/13/2021 1448  Last data filed at 12/13/2021 1239  Gross per 24 hour   Intake --   Output 10 ml   Net -10 ml       Pain assessment:  Pain Level: 0    Patient transferred to care of [facility] RN.    12/13/2021 2:48 PM
Wife updated
o2 discontinued
arrival for surgery. NOTE: ALL Gastric, Bariatric and Bowel surgery patients MUST follow their surgeon's instructions. 10. No gum, candy, mints, or ice chips day of procedure. 11. Please refrain from drinking alcohol the day before or day of your procedure. 12. Please do not smoke the day of your procedure. 13. Dress in loose, comfortable clothing appropriate for redressing after your procedure. Do not wear jewelry (including body piercings), make-up, fingernail polish, lotion, powders or metal hairclips. 15. Contacts will need to be removed prior to surgery. You may want to bring your eye glasses to wear immediately before and after surgery. 14. Dentures will need to be removed before your procedure. 13. Bring cases for your glasses, contacts, dentures, or hearing aids to protect them while you are in surgery. 16. If you use a CPAP, please bring it with you on the day of your procedure. 17. Do not shave or wax for 72 hours prior to procedure near your operative site  18. FOR WOMAN OF CHILDBEARING AGE ONLY- please make sure we can collect a urine sample on arrival.     If you have further questions, you may contact your surgeon's office or us at [phone]     Left instructions on patient's voicemail.     Columba Vogel RN.12/7/2021 .8:58 AM

## 2021-12-13 NOTE — ANESTHESIA POSTPROCEDURE EVALUATION
Department of Anesthesiology  Postprocedure Note    Patient: Thaddeus Do  MRN: [id number]  YOB: 1975  Date of evaluation: 12/13/2021  Time:  12:45 PM     Procedure Summary     Date: 12/13/21 Room / Location: [address] [place] [address]    Anesthesia Start: 7744 Anesthesia Stop: 9210    Procedure: LAPAROSCOPIC ASSISTED COLONOSCOPY WITH POLYPECTOMY, TATTOO (N/A ) Diagnosis:       Adenomatous polyp of sigmoid colon      (SIGMOID POLYP)    Surgeons: Ananda Dillon MD Responsible Provider: Kaden Kwok DO    Anesthesia Type: general ASA Status: 3          Anesthesia Type: general    Juan Phase I: Juan Score: 10    Juan Phase II:      Last vitals: Reviewed and per EMR flowsheets.        Anesthesia Post Evaluation    Patient location during evaluation: PACU  Patient participation: complete - patient cannot participate  Level of consciousness: lethargic  Pain score: 1  Airway patency: patent  Nausea & Vomiting: no nausea and no vomiting  Complications: no  Cardiovascular status: hemodynamically stable  Respiratory status: acceptable  Hydration status: euvolemic  Multimodal analgesia pain management approach

## 2021-12-14 NOTE — OP NOTE
[address]               [address]                                OPERATIVE REPORT    PATIENT NAME: Tatyana Lara                     :        1975  MED REC NO:   [id number]                          ROOM:  ACCOUNT NO:   [de-identified]                           ADMIT DATE: 2021  PROVIDER:     Magdalena Scott MD    DATE OF PROCEDURE:  2021    SURGEON:  Magdalena Scott MD    ASSISTANT:  Jatinder Stewart PGY-3, resident. PREOPERATIVE DIAGNOSIS:  Endoscopically difficult to resect polypoid  adenomatous lesion. POSTOPERATIVE DIAGNOSIS:  Endoscopically difficult to resect polypoid  adenomatous lesion. PROCEDURES:  1. Diagnostic laparoscopy with laparoscopic assistance. 2.  Colonoscopy to cecum with snare polypectomy of sigmoid colon polyp. 3.  Colonoscopy to cecum with tattoo injection with 2 mL of spot ink. ANESTHESIA:  General endotracheal.    COMPLICATIONS:  None. SPECIMEN:  Sigmoid polyp. ESTIMATED BLOOD LOSS:  Minimal.    INDICATIONS:  The patient is a 77-year-old male who recently underwent a  colonoscopy. He was found to have a very large polyp of the mid sigmoid  colon. This was endoscopically very large and the GI doctor did not  feel comfortable excising this endoscopically given its large size. He  was then referred to me for consideration of laparoscopic-assisted  colonoscopy versus surgical resection. I offered the patient  laparoscopic-assisted colonoscopy with polypectomy given the ability to  be more aggressive endoscopically while in the operating room. Discussed with the patient the risks of the surgery including but not  limited to bleeding, infection, perforation, need for additional  operations, damage to intraabdominal structures, potential malignancy  that could require interval surgery.   He understood that he is at high  risk for complication given his morbid obesity with a BMI of 48.    PROCEDURE DETAILS:  The patient was brought to the operating theater,  placed supine on the operating room table. General endotracheal  intubation was performed by Anesthesiology. The patient was placed in  the lithotomy position. His abdomen was prepped and draped in the usual  sterile fashion using chlorhexidine prep solution. Timeout was  performed confirming the patient's identity as well as the operative  site. Antibiotics were confirmed to be perfusing. All safety points  were followed. SCDs were on and functioning. Began by getting in laparoscopically. I made an incision in the right  upper quadrant midclavicular line, at Westfall's point. A 0-degree 5 mm  laparoscope was used to enter the abdominal cavity under direct Optiview  fashion. Abdomen was entered without complication, insufflated to 15  mmHg. We then placed an additional port in the right mid abdomen. The  patient was then placed in the head down and left side up positioning. I then went below and advanced the colonoscope transanally under direct  luminal visualization all the way to the cecum, which was confirmed by  the ileocecal valve and the appendiceal orifice. Photo documentation  was obtained of this. I then slowly came back and withdrew and inspected the colon, took at  least six minutes for withdrawal until we reached the area of the large  polyp in the mid sigmoid colon, it was clearly visible here. It was at  about 30 cm or so from the anal verge. It was fairly large and on a  fixed stock, but it was pedunculated, so I decided that we would be able  to proceed with endoscopic retrieval.  The large hot snare cautery was  then placed through the entirety of the polyp onto the stock. I then  slowly cauterized through the base of the stock, and the polyp was  completely  from the wall of the colon.   I then used suction to  suction the polyp out from the anus and sent this for pathology labeled  as sigmoid polyp. Grossly, we had very good margins on the stock, and I  was confident that we had removed all the abnormal-appearing tissue. I  then went back in endoscopically and visualized the transection site. It appeared to be hemostatic. We performed a leak test by using  laparoscopic  to irrigate fluid over the area of the sigmoid  colon. Air insufflation from the colonoscope revealed no evidence of  leak. I then used 2 mL of Spot tattoo ink to inject in the submucosal  plane for future surveillance. The colonoscope was then completely  removed and photo documentation was obtained of the polypectomy site. Laparoscopically, we saw no other abnormalities. The patient was placed  back in flat positioning. The laparoscopic ports were then removed. The abdomen was desufflated. The incision irrigated and closed using  4-0 Monocryl and skin glue. The patient tolerated the procedure well. He was then extubated and  brought to the PACU. Sponge, needle, and instrumentation counts were  correct x2 at the end of the procedure.         Quita Elizabeth MD    D: 12/13/2021 12:43:29       T: 12/13/2021 19:36:52     [email or web address]  Job#: [id number]     Doc#: [id number]    CC:

## 2021-12-29 ENCOUNTER — OFFICE VISIT (OUTPATIENT)
Dept: SURGERY | Age: 46
End: 2021-12-29

## 2021-12-29 VITALS
DIASTOLIC BLOOD PRESSURE: 95 MMHG | SYSTOLIC BLOOD PRESSURE: 175 MMHG | WEIGHT: 315 LBS | HEART RATE: 95 BPM | OXYGEN SATURATION: 97 % | HEIGHT: 68 IN | BODY MASS INDEX: 47.74 KG/M2

## 2021-12-29 DIAGNOSIS — E66.01 CLASS 3 SEVERE OBESITY DUE TO EXCESS CALORIES WITHOUT SERIOUS COMORBIDITY WITH BODY MASS INDEX (BMI) OF 45.0 TO 49.9 IN ADULT (HCC): ICD-10-CM

## 2021-12-29 DIAGNOSIS — D12.5 ADENOMATOUS POLYP OF SIGMOID COLON: Primary | ICD-10-CM

## 2021-12-29 PROCEDURE — 99024 POSTOP FOLLOW-UP VISIT: CPT | Performed by: SURGERY

## 2021-12-29 NOTE — PROGRESS NOTES
[address] [address] [address] [facility]  Dept: [phone]  Dept Fax: [phone]  Loc: [phone]    Visit Date: 12/29/2021    Carmina Aguero is a 55 y.o. male who presents today for: Post-Op Check (Lap assisted C-Scope )      Subjective:     Carmina Aguero is a 55 y.o. male here for postoperative visit after lap assisted colonoscopy with polypectomy 2 weeks ago. Overall doing very well. Minimal discomfort. Bowels working regularly. Did have hives a couple days after the surgery, of unknown etiology    Patient's problem list, medications, past medical, surgical, family, and social histories were reviewed and updated in the chart as indicated today. Objective:     BP (!) 175/95   Pulse 95   Ht 5' 8\" (1.727 m)   Wt (!) 317 lb 9.6 oz (144.1 kg)   SpO2 97%   BMI 48.29 kg/m²     Abdominal/wound: Laparoscopic sites well-healed, nontender    Assessment/Plan:       ASSESSMENT/PLAN:    2-week status post lap assisted colonoscopy with polypectomy. We had previously discussed the pathology report over the phone, consistent with adenomatous tissue approaching high-grade dysplasia, but completely excised with a large stalk negative margin. All he needs is a colonoscopy in 1 year for surveillance purposes. DISPOSITION: Follow-up with me as needed    Note completed using dictation software, please excuse any errors. Referring/primary care physician updated through [employer] note if PCP was listed.     Electronically signed by Stephanie Clarke MD on 12/29/2021 at 1:33 PM

## 2022-10-11 ENCOUNTER — TELEPHONE (OUTPATIENT)
Dept: SURGERY | Age: 47
End: 2022-10-11

## 2022-10-11 NOTE — TELEPHONE ENCOUNTER
Patient called to see if he needs to have a colonoscopy with Dr. Malgorzata Stokes or if he needs to go back to [address], where he had his initial colonoscopy done. He was referred to Dr. Malgorzata Stokes by [address] that's why the name was given to him. Also, patient was last seen by Dr. Malgorzata Stokes back in 12/2021    Please call patient back at [phone] to determine what course of action to take.

## 2022-10-12 NOTE — TELEPHONE ENCOUNTER
Called pt and advised he is okay to return to [address]). Per office note note patient is as needed with Dr. Sebas Hernandez.

## (undated) DEVICE — TOTAL TRAY, 16FR 10ML SIL FOLEY, URN: Brand: MEDLINE

## (undated) DEVICE — SNARE ENDOSCP L240CM SHTH DIA2.4MM LOOP W30MM MIN WRK CHN

## (undated) DEVICE — Device: Brand: SPOT EX ENDOSCOPIC TATTOO

## (undated) DEVICE — GLOVE SURG SZ 7 CRM LTX FREE POLYISOPRENE POLYMER BEAD ANTI

## (undated) DEVICE — SYRINGE CATH TIP 50ML

## (undated) DEVICE — THE ARTICULATOR INJECTION NEEDLE IS A SINGLE USE, DISPOSABLE, FLEXIBLE SHEATH INJECTION NEEDLE USED FOR THE INJECTION OF VARIOUS TYPES OF MEDIA THROUGH FLEXIBLE ENDOSCOPES.: Brand: ARTICULATOR

## (undated) DEVICE — TRAY PREP DRY W/ PREM GLV 2 APPL 6 SPNG 2 UNDPD 1 OVERWRAP

## (undated) DEVICE — PUMP SUC IRR TBNG L10FT W/ HNDPC ASSEMB STRYKEFLOW 2

## (undated) DEVICE — 3M™ IOBAN™ 2 ANTIMICROBIAL INCISE DRAPE 6648EZ: Brand: IOBAN™ 2

## (undated) DEVICE — BOWL MED L 32OZ PLAS W/ MOLD GRAD EZ OPN PEEL PCH

## (undated) DEVICE — SUTURE MCRYL SZ 4-0 L27IN ABSRB UD L19MM PS-2 1/2 CIR PRIM Y426H

## (undated) DEVICE — SPONGE,LAP,18"X18",DLX,XR,ST,5/PK,40/PK: Brand: MEDLINE

## (undated) DEVICE — ERBE NESSY®PLATE 170 SPLIT; 168CM²; CABLE 3M: Brand: ERBE

## (undated) DEVICE — GENERAL LAPAROSCOPIC: Brand: MEDLINE INDUSTRIES, INC.

## (undated) DEVICE — PACK LAP IV REINF TBL CVR ADH UTIL UNDERBUTTOCK DRP W CUF

## (undated) DEVICE — GOWN,SIRUS,POLYRNF,BRTHSLV,XL,30/CS: Brand: MEDLINE

## (undated) DEVICE — TOWEL,STOP FLAG GOLD N-W: Brand: MEDLINE

## (undated) DEVICE — TROCAR: Brand: KII FIOS FIRST ENTRY

## (undated) DEVICE — SOLUTION ANTIFOG VIS SYS CLEARIFY LAPSCP

## (undated) DEVICE — 40583 XL ADVANCED TRENDELENBURG POSITIONING KIT: Brand: 40583 XL ADVANCED TRENDELENBURG POSITIONING KIT

## (undated) DEVICE — SOLUTION INJ LR VISIV 1000ML BG

## (undated) DEVICE — APPLICATOR MEDICATED 26 CC SOLUTION HI LT ORNG CHLORAPREP

## (undated) DEVICE — NEEDLE HYPO 22GA L1 1/2IN PIVOTING SHLD FOR LUERLOCK SYR